# Patient Record
Sex: MALE | Race: WHITE | Employment: FULL TIME | URBAN - METROPOLITAN AREA
[De-identification: names, ages, dates, MRNs, and addresses within clinical notes are randomized per-mention and may not be internally consistent; named-entity substitution may affect disease eponyms.]

---

## 2017-02-10 ENCOUNTER — TELEPHONE (OUTPATIENT)
Dept: PULMONOLOGY | Facility: CLINIC | Age: 47
End: 2017-02-10

## 2018-01-07 ENCOUNTER — TELEPHONE (OUTPATIENT)
Dept: INTERNAL MEDICINE CLINIC | Facility: CLINIC | Age: 48
End: 2018-01-07

## 2018-01-07 NOTE — TELEPHONE ENCOUNTER
Patient's wife called stating that patient and children have severe gastroenteritis. Children were prescribed Zofran.   Although has been has not started vomiting would like some Zofran because she knows the course of the disease and does not want to wait

## 2018-01-30 NOTE — PROGRESS NOTES
Mary Garcia is a 50year old male. HPI:   1. Tobacco use disorder    Patient has been smoking for years and continues to engage in this despite repeated warnings about the adverse health consequences of doing this behavior.     2. Pulmonary nodules    H medicines are available that can be prescribed to help them stop smoking including nicotine patches, zyban, and chantix. Electronic cigarettes can help decrease nicotine intake and make quitting easier.  Advised that I am able to help them through whatever

## 2018-02-10 ENCOUNTER — HOSPITAL ENCOUNTER (OUTPATIENT)
Dept: CT IMAGING | Age: 48
Discharge: HOME | End: 2018-02-10
Attending: INTERNAL MEDICINE
Payer: COMMERCIAL

## 2018-02-10 DIAGNOSIS — R91.8 PULMONARY NODULES: ICD-10-CM

## 2018-02-10 LAB — CREAT BLD-MCNC: 1 MG/DL (ref 0.5–1.5)

## 2018-02-10 PROCEDURE — 71260 CT THORAX DX C+: CPT | Performed by: INTERNAL MEDICINE

## 2018-02-10 PROCEDURE — 82565 ASSAY OF CREATININE: CPT

## 2018-02-14 ENCOUNTER — TELEPHONE (OUTPATIENT)
Dept: OTHER | Age: 48
End: 2018-02-14

## 2018-02-14 DIAGNOSIS — R16.1 SPLENOMEGALY: Primary | ICD-10-CM

## 2018-02-14 NOTE — TELEPHONE ENCOUNTER
Hemalogy referral for [facility] entered in error--new hematology referral for [place] ordered-please disregard first referral.

## 2018-02-14 NOTE — TELEPHONE ENCOUNTER
Spoke with wife [address] (RICHARD verified) and relayed PVR messages below--she verbalizes understanding and agreement. Hematology names and # given to wife to call for appt and referral ordered as per PVR messages below.  No further questions/concerns at this jenifer

## 2018-02-15 ENCOUNTER — TELEPHONE (OUTPATIENT)
Dept: INTERNAL MEDICINE CLINIC | Facility: CLINIC | Age: 48
End: 2018-02-15

## 2018-02-15 NOTE — TELEPHONE ENCOUNTER
2015, 2016 scripts for Chantix. Should directions say take as directed or take 0.5 mg by mouth 2 times daily? Pharmacist said if starting new again, should take as directed on package. Please advise.

## 2018-02-15 NOTE — TELEPHONE ENCOUNTER
Pt's wife is calling to inform  that the pharmacy has attempted to contact our office twice with no response. I see no encounter. However, the pharmacy is saying the medication's dosage is incorrect. pls advise.  Thank you    She is requesting a call back

## 2018-02-16 NOTE — TELEPHONE ENCOUNTER
Spoke with Ag at [facility] and phoned in PVR order to take as per starter pack instructions. No further questions/concerns at this time.

## 2018-02-19 ENCOUNTER — APPOINTMENT (OUTPATIENT)
Dept: HEMATOLOGY/ONCOLOGY | Facility: HOSPITAL | Age: 48
End: 2018-02-19
Attending: INTERNAL MEDICINE
Payer: COMMERCIAL

## 2018-02-27 ENCOUNTER — OFFICE VISIT (OUTPATIENT)
Dept: HEMATOLOGY/ONCOLOGY | Facility: HOSPITAL | Age: 48
End: 2018-02-27
Attending: INTERNAL MEDICINE
Payer: COMMERCIAL

## 2018-02-27 ENCOUNTER — LAB ENCOUNTER (OUTPATIENT)
Dept: LAB | Age: 48
End: 2018-02-27
Attending: INTERNAL MEDICINE
Payer: COMMERCIAL

## 2018-02-27 VITALS
HEIGHT: 70 IN | SYSTOLIC BLOOD PRESSURE: 111 MMHG | RESPIRATION RATE: 16 BRPM | DIASTOLIC BLOOD PRESSURE: 53 MMHG | BODY MASS INDEX: 27.77 KG/M2 | TEMPERATURE: 98 F | HEART RATE: 120 BPM | WEIGHT: 194 LBS

## 2018-02-27 DIAGNOSIS — R16.1 SPLENOMEGALY: ICD-10-CM

## 2018-02-27 DIAGNOSIS — R91.1 LUNG NODULE < 6CM ON CT: ICD-10-CM

## 2018-02-27 DIAGNOSIS — R16.1 SPLENOMEGALY: Primary | ICD-10-CM

## 2018-02-27 DIAGNOSIS — Z71.6 ENCOUNTER FOR SMOKING CESSATION COUNSELING: ICD-10-CM

## 2018-02-27 LAB
ALBUMIN SERPL BCP-MCNC: 4.4 G/DL (ref 3.5–4.8)
ALBUMIN/GLOB SERPL: 1.5 {RATIO} (ref 1–2)
ALP SERPL-CCNC: 56 U/L (ref 32–100)
ALT SERPL-CCNC: 22 U/L (ref 17–63)
ANION GAP SERPL CALC-SCNC: 9 MMOL/L (ref 0–18)
AST SERPL-CCNC: 17 U/L (ref 15–41)
BASOPHILS # BLD: 0.1 K/UL (ref 0–0.2)
BASOPHILS NFR BLD: 1 %
BILIRUB SERPL-MCNC: 0.5 MG/DL (ref 0.3–1.2)
BUN SERPL-MCNC: 10 MG/DL (ref 8–20)
BUN/CREAT SERPL: 10 (ref 10–20)
CALCIUM SERPL-MCNC: 9.4 MG/DL (ref 8.5–10.5)
CHLORIDE SERPL-SCNC: 100 MMOL/L (ref 95–110)
CO2 SERPL-SCNC: 29 MMOL/L (ref 22–32)
CREAT SERPL-MCNC: 1 MG/DL (ref 0.5–1.5)
EOSINOPHIL # BLD: 0.2 K/UL (ref 0–0.7)
EOSINOPHIL NFR BLD: 2 %
ERYTHROCYTE [DISTWIDTH] IN BLOOD BY AUTOMATED COUNT: 12.5 % (ref 11–15)
GLOBULIN PLAS-MCNC: 3 G/DL (ref 2.5–3.7)
GLUCOSE SERPL-MCNC: 113 MG/DL (ref 70–99)
HCT VFR BLD AUTO: 50.2 % (ref 41–52)
HGB BLD-MCNC: 17.6 G/DL (ref 13.5–17.5)
IGA SERPL-MCNC: 114 MG/DL (ref 68–378)
IGM SERPL-MCNC: 191 MG/DL (ref 60–263)
IMMUNOGLOBULIN PNL SER-MCNC: 1137 MG/DL (ref 694–1618)
LDH SERPL L TO P-CCNC: 126 U/L (ref 98–192)
LYMPHOCYTES # BLD: 3.7 K/UL (ref 1–4)
LYMPHOCYTES NFR BLD: 38 %
MCH RBC QN AUTO: 31.2 PG (ref 27–32)
MCHC RBC AUTO-ENTMCNC: 35 G/DL (ref 32–37)
MCV RBC AUTO: 89.1 FL (ref 80–100)
MONOCYTES # BLD: 0.5 K/UL (ref 0–1)
MONOCYTES NFR BLD: 5 %
NEUTROPHILS # BLD AUTO: 5.2 K/UL (ref 1.8–7.7)
NEUTROPHILS NFR BLD: 53 %
OSMOLALITY UR CALC.SUM OF ELEC: 286 MOSM/KG (ref 275–295)
PATIENT FASTING: NO
PLATELET # BLD AUTO: 256 K/UL (ref 140–400)
PMV BLD AUTO: 8.2 FL (ref 7.4–10.3)
POTASSIUM SERPL-SCNC: 3.6 MMOL/L (ref 3.3–5.1)
PROT SERPL-MCNC: 7.4 G/DL (ref 5.9–8.4)
PSA SERPL-MCNC: 0.5 NG/ML (ref 0–4)
RBC # BLD AUTO: 5.64 M/UL (ref 4.5–5.9)
SODIUM SERPL-SCNC: 138 MMOL/L (ref 136–144)
URATE SERPL-MCNC: 5.8 MG/DL (ref 3.3–8.7)
WBC # BLD AUTO: 9.8 K/UL (ref 4–11)

## 2018-02-27 PROCEDURE — 86334 IMMUNOFIX E-PHORESIS SERUM: CPT

## 2018-02-27 PROCEDURE — 82784 ASSAY IGA/IGD/IGG/IGM EACH: CPT

## 2018-02-27 PROCEDURE — 36415 COLL VENOUS BLD VENIPUNCTURE: CPT

## 2018-02-27 PROCEDURE — 81403 MOPATH PROCEDURE LEVEL 4: CPT

## 2018-02-27 PROCEDURE — 85025 COMPLETE CBC W/AUTO DIFF WBC: CPT

## 2018-02-27 PROCEDURE — 83883 ASSAY NEPHELOMETRY NOT SPEC: CPT

## 2018-02-27 PROCEDURE — 84153 ASSAY OF PSA TOTAL: CPT

## 2018-02-27 PROCEDURE — 81270 JAK2 GENE: CPT

## 2018-02-27 PROCEDURE — 84550 ASSAY OF BLOOD/URIC ACID: CPT

## 2018-02-27 PROCEDURE — 88374 M/PHMTRC ALYS ISHQUANT/SEMIQ: CPT

## 2018-02-27 PROCEDURE — 81402 MOPATH PROCEDURE LEVEL 3: CPT

## 2018-02-27 PROCEDURE — 99245 OFF/OP CONSLTJ NEW/EST HI 55: CPT | Performed by: INTERNAL MEDICINE

## 2018-02-27 PROCEDURE — 82232 ASSAY OF BETA-2 PROTEIN: CPT

## 2018-02-27 PROCEDURE — 81219 CALR GENE COM VARIANTS: CPT

## 2018-02-27 PROCEDURE — 80053 COMPREHEN METABOLIC PANEL: CPT

## 2018-02-27 PROCEDURE — 83615 LACTATE (LD) (LDH) ENZYME: CPT

## 2018-02-27 PROCEDURE — 84165 PROTEIN E-PHORESIS SERUM: CPT

## 2018-02-27 PROCEDURE — 87389 HIV-1 AG W/HIV-1&-2 AB AG IA: CPT

## 2018-02-27 NOTE — PROGRESS NOTES
Cancer Center Consultation Note    Patient Name: Mervyn Crigler   YOB: 1970   Medical Record Number: [id number]   CSN: [id number]   Consulting Physician: Nelly Ramon MD  Referring Physician(s): Deepak Piedra  Date of Consultation: 2/ hyperlipidemia   • Hypertension Mother    • Diabetes Mother 61   • Breast Cancer Sister 36   • Bleeding Disorders Neg    • Clotting Disorder Neg          Social History:    Social History  Social History   Marital status:   Spouse name: N/A    Years weakness. Neurological: Negative for headaches, dizziness, speech problems, gait problems and weakness. A comprehensive 14 point review of systems was completed. Pertinent positives and negatives noted in the the HPI.      Vital Signs:  /53 (BP L Imaging:    CT chest with contrast 2/10/18  FINDINGS:          CARDIAC:         No enlargement, pericardial thickening, or significant calcification. MEDIASTINUM/GRISELDA:    No mass or enlarged adenopathy.     LUNGS/ PLEURA[de-identified]         Numerous small bila Cholelithiasis. 5. No other significant abnormalities.       Impression:    (R16.1) Splenomegaly  (primary encounter diagnosis)  Plan: HIV AG AB COMBO, PSA, CBC WITH DIFFERENTIAL         WITH PLATELET, MPN, JAK2 W/REFLEX EXON, MPL,         CALR , COMP META Vera/ET/primary myelofibrosis  --Planning to check PSA in light of patient's sister with breast cancer history for possible metastatic solid tumor metastasis causing splenomegaly  --Planning to evaluate for possible plasma cell dyscrasia with SPEP/immunofi

## 2018-02-28 LAB
HIV1+2 AB SERPL QL IA: NONREACTIVE
KAPPA FREE LIGHT CHAIN: 2.29 MG/DL (ref 0.33–1.94)
KAPPA/LAMBDA FLC RATIO: 1.5 (ref 0.26–1.65)
LAMBDA FREE LIGHT CHAIN: 1.52 MG/DL (ref 0.57–2.63)

## 2018-03-02 LAB
ALBUMIN SERPL ELPH-MCNC: 4.71 G/DL (ref 3.75–5.21)
ALBUMIN/GLOB SERPL: 1.53 {RATIO} (ref 1–2)
ALPHA1 GLOB SERPL ELPH-MCNC: 0.35 G/DL (ref 0.19–0.46)
ALPHA2 GLOB SERPL ELPH-MCNC: 0.73 G/DL (ref 0.48–1.05)
B-GLOBULIN SERPL ELPH-MCNC: 0.77 G/DL (ref 0.68–1.23)
BETA-2 MICROGLOBULIN, SERUM OR PLASMA: 2.2 MG/L
GAMMA GLOB SERPL ELPH-MCNC: 1.24 G/DL (ref 0.62–1.7)
TOTAL PROTEIN (SPECIAL TESTING): 7.8 G/DL (ref 6.5–9.1)

## 2018-03-07 ENCOUNTER — TELEPHONE (OUTPATIENT)
Dept: INTERNAL MEDICINE CLINIC | Facility: CLINIC | Age: 48
End: 2018-03-07

## 2018-03-08 ENCOUNTER — TELEPHONE (OUTPATIENT)
Dept: HEMATOLOGY/ONCOLOGY | Facility: HOSPITAL | Age: 48
End: 2018-03-08

## 2018-03-08 NOTE — TELEPHONE ENCOUNTER
Telephoned patient regarding his lab testing results re: work up for splenomegaly.  I've informed Yesica Burnett that his HIV results are negative/non-reactive, evaluation for plasma cell dyscrasia found negative results, and no evidence of any myeloproliferative michael

## 2018-03-08 NOTE — TELEPHONE ENCOUNTER
I Spoke with Obey Enter she was still anxious, she believes its bad new. I let her know results are not given over the phone and for the patient to please come to the appointment, The doctor will give him the results.  She will call him to make sure he is aware

## 2018-03-08 NOTE — TELEPHONE ENCOUNTER
Laure Patient the patient wife called and said she would like the patient blood test results, she called the PCP but since  ordered the test, Laure Patient is anxious for the test results.  Please advise

## 2018-03-08 NOTE — TELEPHONE ENCOUNTER
Jordy Pool called again asking to speak with the doctor concerning his test results. Jordy Pool cancelled his appointment for tomorrow due to work schedule. Jordy Pool can be reached at [phone] Please Advise.

## 2018-03-09 ENCOUNTER — APPOINTMENT (OUTPATIENT)
Dept: HEMATOLOGY/ONCOLOGY | Facility: HOSPITAL | Age: 48
End: 2018-03-09
Attending: INTERNAL MEDICINE
Payer: COMMERCIAL

## 2018-03-10 ENCOUNTER — HOSPITAL ENCOUNTER (OUTPATIENT)
Dept: ULTRASOUND IMAGING | Age: 48
Discharge: HOME | End: 2018-03-10
Attending: INTERNAL MEDICINE
Payer: COMMERCIAL

## 2018-03-15 ENCOUNTER — HOSPITAL ENCOUNTER (OUTPATIENT)
Dept: ULTRASOUND IMAGING | Facility: HOSPITAL | Age: 48
Discharge: HOME | End: 2018-03-15
Attending: INTERNAL MEDICINE
Payer: COMMERCIAL

## 2018-03-15 DIAGNOSIS — R16.1 SPLENOMEGALY: ICD-10-CM

## 2018-03-15 PROCEDURE — 93975 VASCULAR STUDY: CPT | Performed by: INTERNAL MEDICINE

## 2018-03-15 PROCEDURE — 76700 US EXAM ABDOM COMPLETE: CPT | Performed by: INTERNAL MEDICINE

## 2018-03-29 ENCOUNTER — OFFICE VISIT (OUTPATIENT)
Dept: HEMATOLOGY/ONCOLOGY | Facility: HOSPITAL | Age: 48
End: 2018-03-29
Attending: INTERNAL MEDICINE
Payer: COMMERCIAL

## 2018-03-29 VITALS
RESPIRATION RATE: 18 BRPM | BODY MASS INDEX: 27.49 KG/M2 | WEIGHT: 192 LBS | TEMPERATURE: 98 F | DIASTOLIC BLOOD PRESSURE: 82 MMHG | HEART RATE: 91 BPM | HEIGHT: 70 IN | SYSTOLIC BLOOD PRESSURE: 134 MMHG

## 2018-03-29 DIAGNOSIS — R16.1 SPLENOMEGALY: Primary | ICD-10-CM

## 2018-03-29 DIAGNOSIS — Z71.6 ENCOUNTER FOR SMOKING CESSATION COUNSELING: ICD-10-CM

## 2018-03-29 DIAGNOSIS — R91.1 LUNG NODULE < 6CM ON CT: ICD-10-CM

## 2018-03-29 PROCEDURE — 99214 OFFICE O/P EST MOD 30 MIN: CPT | Performed by: INTERNAL MEDICINE
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
## 2018-03-31 NOTE — IMAGING NOTE
Called patient and spoke to wife. Wrongly scheduled at [facility] on 3/10. Explained this exam is done at the hospital and in the morning. Pt will check their schedule and call on mon 3/12.  Pts wife was not pleased, saying she called scheduling twice and was not Sharon Richard(Attending)

## 2019-01-29 RX ORDER — IBUPROFEN 600 MG/1
TABLET ORAL
Qty: 30 TABLET | Refills: 2 | Status: SHIPPED | OUTPATIENT
Start: 2019-01-29 | End: 2019-12-27

## 2019-01-29 NOTE — TELEPHONE ENCOUNTER
Please review; protocol failed.     Refill Protocol Appointment Criteria  · Appointment scheduled in the past 6 months or in the next 3 months  Recent Outpatient Visits            10 months ago Splenomegaly    [facility] Hematology Oncology [facility]

## 2019-01-29 NOTE — TELEPHONE ENCOUNTER
Please review; protocol failed.     Refill Protocol Appointment Criteria  · Appointment scheduled in the past 6 months or in the next 3 months  Recent Outpatient Visits            10 months ago [facility] Hematology Oncology [facility]

## 2019-03-06 ENCOUNTER — TELEPHONE (OUTPATIENT)
Dept: INTERNAL MEDICINE CLINIC | Facility: CLINIC | Age: 49
End: 2019-03-06

## 2019-03-06 DIAGNOSIS — R91.8 MULTIPLE PULMONARY NODULES: Primary | ICD-10-CM

## 2019-03-06 RX ORDER — MELOXICAM 15 MG/1
TABLET ORAL
Qty: 30 TABLET | Refills: 2 | Status: SHIPPED | OUTPATIENT
Start: 2019-03-06

## 2019-03-06 NOTE — TELEPHONE ENCOUNTER
Pt requesting orders for chest CT be added to the chart. Would like a recheck after last year. Please call back with confirmation once added and mailed to home address. States that per insurance they need to go to a \"non-hospital\" location.

## 2019-05-02 ENCOUNTER — HOSPITAL ENCOUNTER (OUTPATIENT)
Dept: CT IMAGING | Facility: HOSPITAL | Age: 49
Discharge: HOME | End: 2019-05-02
Attending: INTERNAL MEDICINE
Payer: COMMERCIAL

## 2019-05-02 DIAGNOSIS — R91.8 MULTIPLE PULMONARY NODULES: ICD-10-CM

## 2019-05-02 PROCEDURE — 71250 CT THORAX DX C-: CPT | Performed by: INTERNAL MEDICINE

## 2019-05-06 ENCOUNTER — OFFICE VISIT (OUTPATIENT)
Dept: INTERNAL MEDICINE CLINIC | Facility: CLINIC | Age: 49
End: 2019-05-06

## 2019-05-06 VITALS
RESPIRATION RATE: 16 BRPM | HEIGHT: 70 IN | DIASTOLIC BLOOD PRESSURE: 70 MMHG | SYSTOLIC BLOOD PRESSURE: 107 MMHG | WEIGHT: 196 LBS | BODY MASS INDEX: 28.06 KG/M2 | HEART RATE: 68 BPM

## 2019-05-06 DIAGNOSIS — Z12.12 SCREENING FOR COLORECTAL CANCER: ICD-10-CM

## 2019-05-06 DIAGNOSIS — Z12.11 SCREENING FOR COLORECTAL CANCER: ICD-10-CM

## 2019-05-06 DIAGNOSIS — Z00.00 PHYSICAL EXAM, ANNUAL: Primary | ICD-10-CM

## 2019-05-06 DIAGNOSIS — F17.200 SMOKING ADDICTION: ICD-10-CM

## 2019-05-06 PROCEDURE — 99396 PREV VISIT EST AGE 40-64: CPT | Performed by: INTERNAL MEDICINE

## 2019-05-07 NOTE — PROGRESS NOTES
Veronica Silverio is a 46year old male who presents for an [address].     Patient Active Problem List:     Other specified congenital anomaly of skin     Tobacco use disorder     Asthmatic bronchitis     Physical exam, annual     Vitamin D defi high risk No results found for: GONOCOCCUS   HIV Screening For all adults age 22-65, older adults at increased risk HIV AG AB Combo (no units)   Date Value   02/27/2018 Nonreactive       Syphilis Screening Screen if pregnant or high risk No results found f Drug use: No    Occ: cars : yes      REVIEW OF SYSTEMS:   GENERAL: feels well otherwise  SKIN: denies any unusual skin lesions  EYES: denies blurred vision or double vision  HEENT: denies nasal congestion, sinus pain or ST  LUNGS: denies shortness o Future  -     TSH W REFLEX TO FREE T4; Future       The patient indicates understanding of these issues and agrees to the plan.   The patient is asked to return in 3 months for office visit  Exercise counseling perfomed  STI Prevention counseling perfomed

## 2019-05-10 ENCOUNTER — LAB ENCOUNTER (OUTPATIENT)
Dept: LAB | Facility: HOSPITAL | Age: 49
End: 2019-05-10
Attending: INTERNAL MEDICINE
Payer: COMMERCIAL

## 2019-05-10 DIAGNOSIS — Z00.00 PHYSICAL EXAM, ANNUAL: ICD-10-CM

## 2019-05-10 PROCEDURE — 85025 COMPLETE CBC W/AUTO DIFF WBC: CPT

## 2019-05-10 PROCEDURE — 36415 COLL VENOUS BLD VENIPUNCTURE: CPT

## 2019-05-10 PROCEDURE — 84443 ASSAY THYROID STIM HORMONE: CPT

## 2019-05-10 PROCEDURE — 80053 COMPREHEN METABOLIC PANEL: CPT

## 2019-05-10 PROCEDURE — 80061 LIPID PANEL: CPT

## 2019-05-10 PROCEDURE — 81001 URINALYSIS AUTO W/SCOPE: CPT

## 2019-11-19 PROBLEM — M89.8X7 EXOSTOSIS OF LEFT FOOT: Status: ACTIVE | Noted: 2019-11-19

## 2019-11-19 PROBLEM — L84 TYLOMA: Status: ACTIVE | Noted: 2019-11-19

## 2019-11-19 PROBLEM — R26.2 DIFFICULTY WALKING: Status: ACTIVE | Noted: 2019-11-19

## 2019-12-09 ENCOUNTER — LAB ENCOUNTER (OUTPATIENT)
Dept: LAB | Facility: HOSPITAL | Age: 49
End: 2019-12-09
Attending: INTERNAL MEDICINE
Payer: COMMERCIAL

## 2019-12-09 ENCOUNTER — OFFICE VISIT (OUTPATIENT)
Dept: INTERNAL MEDICINE CLINIC | Facility: CLINIC | Age: 49
End: 2019-12-09

## 2019-12-09 VITALS
HEIGHT: 70 IN | WEIGHT: 196 LBS | RESPIRATION RATE: 16 BRPM | HEART RATE: 85 BPM | SYSTOLIC BLOOD PRESSURE: 112 MMHG | DIASTOLIC BLOOD PRESSURE: 76 MMHG | BODY MASS INDEX: 28.06 KG/M2

## 2019-12-09 DIAGNOSIS — R53.82 CHRONIC FATIGUE: ICD-10-CM

## 2019-12-09 DIAGNOSIS — R53.82 CHRONIC FATIGUE: Primary | ICD-10-CM

## 2019-12-09 DIAGNOSIS — F17.200 SMOKING ADDICTION: ICD-10-CM

## 2019-12-09 DIAGNOSIS — E55.9 VITAMIN D DEFICIENCY: ICD-10-CM

## 2019-12-09 PROCEDURE — 36415 COLL VENOUS BLD VENIPUNCTURE: CPT

## 2019-12-09 PROCEDURE — 84403 ASSAY OF TOTAL TESTOSTERONE: CPT

## 2019-12-09 PROCEDURE — 81003 URINALYSIS AUTO W/O SCOPE: CPT

## 2019-12-09 PROCEDURE — 85025 COMPLETE CBC W/AUTO DIFF WBC: CPT

## 2019-12-09 PROCEDURE — 84402 ASSAY OF FREE TESTOSTERONE: CPT

## 2019-12-09 PROCEDURE — 99214 OFFICE O/P EST MOD 30 MIN: CPT | Performed by: INTERNAL MEDICINE

## 2019-12-09 RX ORDER — ESCITALOPRAM OXALATE 10 MG/1
10 TABLET ORAL DAILY
Qty: 30 TABLET | Refills: 1 | Status: SHIPPED | OUTPATIENT
Start: 2019-12-09 | End: 2020-02-03

## 2019-12-11 ENCOUNTER — PATIENT MESSAGE (OUTPATIENT)
Dept: INTERNAL MEDICINE CLINIC | Facility: CLINIC | Age: 49
End: 2019-12-11

## 2019-12-11 DIAGNOSIS — E55.9 VITAMIN D DEFICIENCY: Primary | ICD-10-CM

## 2019-12-11 PROBLEM — R53.82 CHRONIC FATIGUE: Status: ACTIVE | Noted: 2019-12-11

## 2019-12-11 NOTE — TELEPHONE ENCOUNTER
Called the [facility] lab and it cannot be added on. He will have to come in and be drawn. Called the patient and advised his wife (on RICHARD). She will let him know that he can go in and have the vitamin D level drawn.

## 2019-12-11 NOTE — TELEPHONE ENCOUNTER
Order placed, please call lab and see if they can add this order to his specimen that was collected Monday.

## 2019-12-11 NOTE — TELEPHONE ENCOUNTER
From: Juan Valentin  To: Fritz Riley MD  Sent: 12/11/2019 2:20 AM CST  Subject: Test Results Question    I don't see Vit D results. Was that being checked as well?  Will you be giving a prescription for Vit D or do I just start taking over the cou

## 2019-12-11 NOTE — PROGRESS NOTES
Indira Buchanan is a 44year old male. HPI:   1. Fatigue    Has been noticing more fatigue at work. Falls asleep when he gets home. Has very l;ittle energy. Wore a fitbit. Had 2000 steps in the first 3 hours at work. Denies shortness of breath.     2. Tobac without murmur  GI: good BS's,no masses, HSM or tenderness  EXTREMITIES: no cyanosis, clubbing or edema    ASSESSMENT AND PLAN:   1. Fatigue    Has been having more stress and fatigue at work. No energfy. Woimnders if testosterone low.  Will check blood wor

## 2019-12-13 RX ORDER — CHOLECALCIFEROL (VITAMIN D3) 1250 MCG
1 CAPSULE ORAL DAILY
COMMUNITY
End: 2020-03-06

## 2019-12-14 ENCOUNTER — APPOINTMENT (OUTPATIENT)
Dept: LAB | Facility: HOSPITAL | Age: 49
End: 2019-12-14
Attending: INTERNAL MEDICINE
Payer: COMMERCIAL

## 2019-12-14 DIAGNOSIS — R53.82 CHRONIC FATIGUE: ICD-10-CM

## 2019-12-14 DIAGNOSIS — E55.9 VITAMIN D DEFICIENCY: ICD-10-CM

## 2019-12-14 PROCEDURE — 82607 VITAMIN B-12: CPT

## 2019-12-14 PROCEDURE — 84443 ASSAY THYROID STIM HORMONE: CPT

## 2019-12-14 PROCEDURE — 36415 COLL VENOUS BLD VENIPUNCTURE: CPT

## 2019-12-14 PROCEDURE — 82306 VITAMIN D 25 HYDROXY: CPT

## 2019-12-14 PROCEDURE — 82728 ASSAY OF FERRITIN: CPT

## 2019-12-17 DIAGNOSIS — R53.83 FATIGUE, UNSPECIFIED TYPE: ICD-10-CM

## 2019-12-17 DIAGNOSIS — R79.89 LOW TESTOSTERONE: Primary | ICD-10-CM

## 2019-12-27 RX ORDER — IBUPROFEN 600 MG/1
TABLET ORAL
Qty: 30 TABLET | Refills: 2 | Status: SHIPPED | OUTPATIENT
Start: 2019-12-27 | End: 2020-06-01

## 2019-12-27 RX ORDER — ERGOCALCIFEROL 1.25 MG/1
50000 CAPSULE ORAL
Qty: 12 CAPSULE | Refills: 0 | OUTPATIENT
Start: 2019-12-27 | End: 2020-03-14

## 2019-12-27 NOTE — TELEPHONE ENCOUNTER
Review pended refill request as it does not fall under a protocol.     Last Rx: 1/29/19  LOV: 2 weeks ago

## 2019-12-31 ENCOUNTER — PATIENT MESSAGE (OUTPATIENT)
Dept: INTERNAL MEDICINE CLINIC | Facility: CLINIC | Age: 49
End: 2019-12-31

## 2019-12-31 DIAGNOSIS — E55.9 VITAMIN D DEFICIENCY: Primary | ICD-10-CM

## 2019-12-31 NOTE — TELEPHONE ENCOUNTER
Pt had Vitamin D level checked on 1214 and it was 12.1. He was rx'd Vit D 50,000 on 12/16 (1 cap weekly x12). Pt should not be out of the med yet, sent message asking him if he took 1 cap daily for 12 days in error.

## 2019-12-31 NOTE — TELEPHONE ENCOUNTER
From: Mariana Leary  To: Galilea Gallegos MD  Sent: 12/31/2019 2:53 PM CST  Subject: Prescription Question    Would you please fill the prescription for Vit D 50,000.  I am exhausted again and feel it really made a difference when taking it for the 12

## 2019-12-31 NOTE — TELEPHONE ENCOUNTER
From: Vinayak Ornelas  To: Napoleon Hassan MD  Sent: 12/31/2019 2:55 PM CST  Subject: Prescription Question    Would you please fill the prescription for Vit D 50,000.  I am exhausted again and feel it really made a difference when taking it for the 7 d

## 2019-12-31 NOTE — TELEPHONE ENCOUNTER
Pt confirmed he took 1 capsule daily x12 days vs weekly x12 weeks. Please advise if you need pt do do anything as a result?

## 2020-01-01 NOTE — TELEPHONE ENCOUNTER
Review pended refill request as it does not fall under a protocol.     Last Rx: 12/16/19  LOV: 12/09/19    Ref Range & Units 12/14/19  7:44 AM   Vitamin D, 25OH, Total  30.0 - 100.0 ng/mL 21.1Low         Requested Prescriptions   Pending Prescriptions Disp

## 2020-01-02 RX ORDER — ERGOCALCIFEROL 1.25 MG/1
50000 CAPSULE ORAL
Qty: 12 CAPSULE | Refills: 0 | OUTPATIENT
Start: 2020-01-02 | End: 2020-03-20

## 2020-01-13 ENCOUNTER — OFFICE VISIT (OUTPATIENT)
Dept: INTERNAL MEDICINE CLINIC | Facility: CLINIC | Age: 50
End: 2020-01-13

## 2020-01-13 VITALS
HEIGHT: 70 IN | DIASTOLIC BLOOD PRESSURE: 86 MMHG | SYSTOLIC BLOOD PRESSURE: 121 MMHG | RESPIRATION RATE: 16 BRPM | BODY MASS INDEX: 27.77 KG/M2 | HEART RATE: 76 BPM | WEIGHT: 194 LBS

## 2020-01-13 DIAGNOSIS — F17.200 TOBACCO USE DISORDER: ICD-10-CM

## 2020-01-13 DIAGNOSIS — R53.82 CHRONIC FATIGUE: Primary | ICD-10-CM

## 2020-01-13 DIAGNOSIS — E55.9 VITAMIN D DEFICIENCY: ICD-10-CM

## 2020-01-13 PROCEDURE — 99214 OFFICE O/P EST MOD 30 MIN: CPT | Performed by: INTERNAL MEDICINE

## 2020-01-14 NOTE — PROGRESS NOTES
Wilner Argueta is a 44year old male. HPI:   1. Fatigue    Has been noticing more fatigue at work. Falls asleep when he gets home. Has very l;ittle energy. Wore a fitbit. Had 2000 steps in the first 3 hours at work. Denies shortness of breath.     2. Tobac RRR without murmur  GI: good BS's,no masses, HSM or tenderness  EXTREMITIES: no cyanosis, clubbing or edema    ASSESSMENT AND PLAN:   1. Fatigue    Has been having more stress and fatigue at work. No energfy. Discussed testosterone level.  Will check blood w

## 2020-02-03 DIAGNOSIS — R53.82 CHRONIC FATIGUE: ICD-10-CM

## 2020-02-04 RX ORDER — ESCITALOPRAM OXALATE 10 MG/1
TABLET ORAL
Qty: 90 TABLET | Refills: 1 | Status: SHIPPED | OUTPATIENT
Start: 2020-02-04 | End: 2020-07-28

## 2020-02-04 NOTE — TELEPHONE ENCOUNTER
Sent to provider to review multiple drug interactions notice from epic upon sign-off     Refill passed per [place] [facility] protocol.   Refill Protocol Appointment Criteria  · Appointment scheduled in the past 6 months or in the next 3 months  Recent Outpati

## 2020-03-05 ENCOUNTER — TELEPHONE (OUTPATIENT)
Dept: INTERNAL MEDICINE CLINIC | Facility: CLINIC | Age: 50
End: 2020-03-05

## 2020-03-05 DIAGNOSIS — S99.929A FOOT INJURY, UNSPECIFIED LATERALITY, INITIAL ENCOUNTER: Primary | ICD-10-CM

## 2020-03-05 NOTE — TELEPHONE ENCOUNTER
Jonel Flores calling to request referral to Dr. Ramesh Rockwell    Please sign off referral    Thanks    [address] [facility]

## 2020-03-06 RX ORDER — ERGOCALCIFEROL 1.25 MG/1
CAPSULE ORAL
Qty: 12 CAPSULE | Refills: 0 | Status: SHIPPED | OUTPATIENT
Start: 2020-03-06 | End: 2020-06-10

## 2020-03-06 NOTE — TELEPHONE ENCOUNTER
Review pended refill request as it does not fall under a protocol.     Last Rx: 12/16/19  LOV: 1 month ago

## 2020-06-01 RX ORDER — IBUPROFEN 600 MG/1
TABLET ORAL
Qty: 30 TABLET | Refills: 0 | Status: SHIPPED | OUTPATIENT
Start: 2020-06-01 | End: 2020-08-24

## 2020-06-10 RX ORDER — ERGOCALCIFEROL 1.25 MG/1
CAPSULE ORAL
Qty: 12 CAPSULE | Refills: 0 | Status: SHIPPED | OUTPATIENT
Start: 2020-06-10 | End: 2020-10-04

## 2020-07-26 DIAGNOSIS — R53.82 CHRONIC FATIGUE: ICD-10-CM

## 2020-07-28 RX ORDER — ESCITALOPRAM OXALATE 10 MG/1
TABLET ORAL
Qty: 90 TABLET | Refills: 0 | Status: SHIPPED | OUTPATIENT
Start: 2020-07-28 | End: 2020-12-12

## 2020-08-25 RX ORDER — IBUPROFEN 600 MG/1
TABLET ORAL
Qty: 30 TABLET | Refills: 0 | Status: SHIPPED | OUTPATIENT
Start: 2020-08-25 | End: 2020-10-04

## 2020-10-05 RX ORDER — ERGOCALCIFEROL 1.25 MG/1
50000 CAPSULE ORAL WEEKLY
Qty: 12 CAPSULE | Refills: 0 | Status: SHIPPED | OUTPATIENT
Start: 2020-10-05 | End: 2020-12-09

## 2020-10-05 RX ORDER — IBUPROFEN 600 MG/1
TABLET ORAL
Qty: 30 TABLET | Refills: 0 | Status: SHIPPED | OUTPATIENT
Start: 2020-10-05 | End: 2020-12-14

## 2020-11-16 ENCOUNTER — IMMUNIZATION (OUTPATIENT)
Dept: INTERNAL MEDICINE CLINIC | Facility: CLINIC | Age: 50
End: 2020-11-16

## 2020-11-16 DIAGNOSIS — Z23 NEED FOR VACCINATION: ICD-10-CM

## 2020-11-16 PROCEDURE — 90686 IIV4 VACC NO PRSV 0.5 ML IM: CPT | Performed by: INTERNAL MEDICINE

## 2020-11-16 PROCEDURE — 90471 IMMUNIZATION ADMIN: CPT | Performed by: INTERNAL MEDICINE

## 2020-12-09 RX ORDER — ERGOCALCIFEROL 1.25 MG/1
CAPSULE ORAL
Qty: 12 CAPSULE | Refills: 0 | Status: SHIPPED | OUTPATIENT
Start: 2020-12-09 | End: 2020-12-12

## 2020-12-12 DIAGNOSIS — R53.82 CHRONIC FATIGUE: ICD-10-CM

## 2020-12-12 RX ORDER — ERGOCALCIFEROL 1.25 MG/1
CAPSULE ORAL
Qty: 12 CAPSULE | Refills: 0 | Status: SHIPPED | OUTPATIENT
Start: 2020-12-12 | End: 2021-07-14 | Stop reason: ALTCHOICE

## 2020-12-12 RX ORDER — ESCITALOPRAM OXALATE 10 MG/1
TABLET ORAL
Qty: 90 TABLET | Refills: 0 | Status: SHIPPED | OUTPATIENT
Start: 2020-12-12

## 2020-12-14 RX ORDER — IBUPROFEN 600 MG/1
TABLET ORAL
Qty: 30 TABLET | Refills: 0 | Status: SHIPPED | OUTPATIENT
Start: 2020-12-14

## 2020-12-16 ENCOUNTER — MED REC SCAN ONLY (OUTPATIENT)
Dept: INTERNAL MEDICINE CLINIC | Facility: CLINIC | Age: 50
End: 2020-12-16

## 2021-01-26 ENCOUNTER — PATIENT MESSAGE (OUTPATIENT)
Dept: INTERNAL MEDICINE CLINIC | Facility: CLINIC | Age: 51
End: 2021-01-26

## 2021-01-26 NOTE — TELEPHONE ENCOUNTER
From: Audra Roach  To: Arielle Cornejo MD  Sent: 1/26/2021 12:49 AM CST  Subject: Other    Hi. 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 Can I get an appointment to come in to get? Please advise. Thank you!

## 2021-02-17 RX ORDER — IBUPROFEN 600 MG/1
TABLET ORAL
Qty: 30 TABLET | Refills: 0 | OUTPATIENT
Start: 2021-02-17

## 2021-02-19 NOTE — TELEPHONE ENCOUNTER
From 2/16/21 encounter     Request refused: Appt required, please call patient    Sig: TAKE ONE         Call center please call and schedule an appointment. Thank You. [employer] [employer] message sent to the patient.

## 2021-02-22 RX ORDER — IBUPROFEN 600 MG/1
600 TABLET ORAL
Qty: 30 TABLET | Refills: 0 | OUTPATIENT
Start: 2021-02-22

## 2021-03-08 ENCOUNTER — OFFICE VISIT (OUTPATIENT)
Dept: INTERNAL MEDICINE CLINIC | Facility: CLINIC | Age: 51
End: 2021-03-08

## 2021-03-08 VITALS
DIASTOLIC BLOOD PRESSURE: 72 MMHG | BODY MASS INDEX: 27.63 KG/M2 | SYSTOLIC BLOOD PRESSURE: 112 MMHG | HEART RATE: 81 BPM | RESPIRATION RATE: 16 BRPM | WEIGHT: 193 LBS | HEIGHT: 70 IN

## 2021-03-08 DIAGNOSIS — M79.604 RIGHT LEG PAIN: Primary | ICD-10-CM

## 2021-03-08 DIAGNOSIS — E55.9 VITAMIN D DEFICIENCY: ICD-10-CM

## 2021-03-08 DIAGNOSIS — F17.200 SMOKING ADDICTION: ICD-10-CM

## 2021-03-08 PROCEDURE — 3078F DIAST BP <80 MM HG: CPT | Performed by: INTERNAL MEDICINE

## 2021-03-08 PROCEDURE — 3008F BODY MASS INDEX DOCD: CPT | Performed by: INTERNAL MEDICINE

## 2021-03-08 PROCEDURE — 3074F SYST BP LT 130 MM HG: CPT | Performed by: INTERNAL MEDICINE

## 2021-03-08 PROCEDURE — 99214 OFFICE O/P EST MOD 30 MIN: CPT | Performed by: INTERNAL MEDICINE
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
## 2021-03-27 ENCOUNTER — IMMUNIZATION (OUTPATIENT)
Dept: LAB | Age: 51
End: 2021-03-27
Attending: HOSPITALIST
Payer: COMMERCIAL

## 2021-03-27 DIAGNOSIS — Z23 NEED FOR VACCINATION: Primary | ICD-10-CM

## 2021-03-27 PROCEDURE — 0001A SARSCOV2 VAC 30MCG/0.3ML IM: CPT

## 2021-04-17 ENCOUNTER — IMMUNIZATION (OUTPATIENT)
Dept: LAB | Age: 51
End: 2021-04-17
Attending: HOSPITALIST
Payer: COMMERCIAL

## 2021-04-17 DIAGNOSIS — Z23 NEED FOR VACCINATION: Primary | ICD-10-CM

## 2021-04-17 PROCEDURE — 0002A SARSCOV2 VAC 30MCG/0.3ML IM: CPT

## 2021-07-13 RX ORDER — ERGOCALCIFEROL 1.25 MG/1
50000 CAPSULE ORAL WEEKLY
Qty: 12 CAPSULE | Refills: 0 | OUTPATIENT
Start: 2021-07-13

## 2021-07-13 NOTE — TELEPHONE ENCOUNTER
Patient of Dr. COLBERT please see the below note from Dr. COLBERT     2. Vitamin D deficiency     Has been diagnosed with vitamin D deficiency. Taking vitamin D over the counter daily along with 1500 mg calcium.  Advised to do weight bearing exercises as well to im

## 2021-07-13 NOTE — TELEPHONE ENCOUNTER
Current Outpatient Medications:     •  ERGOCALCIFEROL 1.25 MG (70714 UT) Oral Cap, TAKE ONE CAPSULE BY MOUTH WEEKLY , Disp: 12 capsule, Rfl: 0

## 2021-07-14 RX ORDER — BIOTIN 1 MG
TABLET ORAL
Qty: 1 CAPSULE | Refills: 0 | COMMUNITY
Start: 2021-07-14

## 2021-07-14 NOTE — TELEPHONE ENCOUNTER
The patient's wife stated is this about Vitamin D. We do have a HIPAA from Megha Posadas informed. Was informed with understanding. He will call back to schedule the appointment.     She is asking for clarification:    1500 units of Vitamin D3 with how

## 2021-11-10 ENCOUNTER — TELEPHONE (OUTPATIENT)
Dept: INTERNAL MEDICINE CLINIC | Facility: CLINIC | Age: 51
End: 2021-11-10

## 2021-11-10 DIAGNOSIS — M89.8X7 EXOSTOSIS OF LEFT FOOT: Primary | ICD-10-CM

## 2021-11-10 NOTE — TELEPHONE ENCOUNTER
Patient's wife called back and is upset that the patient has to schedule an appointment with Dr. Chrissie Byrne and refuses to schedule.

## 2021-11-10 NOTE — TELEPHONE ENCOUNTER
Dr. Valentina Estes,    The patient called requesting referral to Dr. Shirley Cuevas for follow up care. The patient was forwarded to your office to schedule appointment to establish care. Pended referral please review diagnosis and sign off if you agree. Thank you.

## 2021-11-10 NOTE — TELEPHONE ENCOUNTER
Patient wife is requesting a referral to see Dr. Sabra Serrano for a foot problem. Patient has an appointment on 11/16/21 and needs referral. Please call back with any questions. Patient was previous patient of Dr. Sergei Lee and will be transferring care.

## 2022-01-02 LAB — AMB EXT COVID-19 RESULT: DETECTED

## 2022-01-06 ENCOUNTER — TELEPHONE (OUTPATIENT)
Dept: INTERNAL MEDICINE CLINIC | Facility: CLINIC | Age: 52
End: 2022-01-06

## 2022-01-06 NOTE — TELEPHONE ENCOUNTER
Spouse calling regarding pt. HIPAA verified. Calling for [facility] guideline clarification. Fully vaccinated, no booster yet. If You Test Positive for COVID-19 (Isolate)  Everyone, regardless of vaccination status. • Stay home for 5 days.   • If you have no

## 2022-05-25 ENCOUNTER — TELEPHONE (OUTPATIENT)
Dept: CASE MANAGEMENT | Age: 52
End: 2022-05-25

## 2022-05-25 NOTE — TELEPHONE ENCOUNTER
Patient is due for annual physical exam. He is a former patient of Dr Brinda Celeste. No voicemail available on mobile number. No answer on home phone.

## 2022-10-22 ENCOUNTER — HOSPITAL ENCOUNTER (OUTPATIENT)
Age: 52
Discharge: HOME | End: 2022-10-22
Payer: COMMERCIAL

## 2022-10-22 VITALS
DIASTOLIC BLOOD PRESSURE: 70 MMHG | RESPIRATION RATE: 16 BRPM | OXYGEN SATURATION: 97 % | TEMPERATURE: 98 F | HEART RATE: 83 BPM | SYSTOLIC BLOOD PRESSURE: 113 MMHG

## 2022-10-22 DIAGNOSIS — J01.90 ACUTE NON-RECURRENT SINUSITIS, UNSPECIFIED LOCATION: Primary | ICD-10-CM

## 2022-10-22 LAB — SARS-COV-2 RNA RESP QL NAA+PROBE: NOT DETECTED

## 2022-10-22 PROCEDURE — 99213 OFFICE O/P EST LOW 20 MIN: CPT

## 2022-10-22 PROCEDURE — 99203 OFFICE O/P NEW LOW 30 MIN: CPT

## 2022-10-22 RX ORDER — DOXYCYCLINE HYCLATE 100 MG/1
100 CAPSULE ORAL 2 TIMES DAILY
Qty: 14 CAPSULE | Refills: 0 | Status: SHIPPED | OUTPATIENT
Start: 2022-10-22 | End: 2022-10-29

## 2022-10-22 NOTE — ED INITIAL ASSESSMENT (HPI)
Pt comes in for eval of cough, congestion, low grade temps, sore throat, tiredness for past 2 weeks. Denies fevers, sob.

## (undated) NOTE — Clinical Note
2/10/2017              [facility]        [id number] [facility] [id number]         Dear Jon Jones,    Our records indicate that the tests ordered for you by Russell Miller, DO  have not been done.   If you have, in fact, already completed